# Patient Record
Sex: FEMALE | Race: ASIAN | Employment: UNEMPLOYED | URBAN - METROPOLITAN AREA
[De-identification: names, ages, dates, MRNs, and addresses within clinical notes are randomized per-mention and may not be internally consistent; named-entity substitution may affect disease eponyms.]

---

## 2017-08-08 ENCOUNTER — OFFICE VISIT (OUTPATIENT)
Dept: FAMILY MEDICINE CLINIC | Facility: CLINIC | Age: 27
End: 2017-08-08

## 2017-08-08 VITALS
SYSTOLIC BLOOD PRESSURE: 104 MMHG | BODY MASS INDEX: 32.43 KG/M2 | RESPIRATION RATE: 16 BRPM | HEIGHT: 59.5 IN | DIASTOLIC BLOOD PRESSURE: 76 MMHG | TEMPERATURE: 99 F | HEART RATE: 80 BPM | WEIGHT: 163 LBS

## 2017-08-08 DIAGNOSIS — R63.5 WEIGHT GAIN: Primary | ICD-10-CM

## 2017-08-08 DIAGNOSIS — E78.5 HYPERLIPIDEMIA, UNSPECIFIED HYPERLIPIDEMIA TYPE: ICD-10-CM

## 2017-08-08 PROCEDURE — 99203 OFFICE O/P NEW LOW 30 MIN: CPT | Performed by: FAMILY MEDICINE

## 2017-08-08 NOTE — PROGRESS NOTES
Pari Robledo is a 32year old female. CHIEF COMPLAINT   Follow up on labs from gyne  HPI:   Seeing Dr. Gaurav alfonso on 8/24/17. Dx with probable PCOS by gyne.   Has gained about 10 kg in last 3 months - hasn't been exercising and eating well due to recent mov with results of lipid panel.

## 2017-11-17 RX ORDER — IBUPROFEN 600 MG/1
600 TABLET ORAL EVERY 6 HOURS PRN
COMMUNITY
End: 2018-01-17

## 2017-11-17 RX ORDER — FERROUS SULFATE 325(65) MG
325 TABLET ORAL 2 TIMES DAILY
COMMUNITY

## 2017-11-20 ENCOUNTER — ANESTHESIA EVENT (OUTPATIENT)
Dept: SURGERY | Facility: HOSPITAL | Age: 27
End: 2017-11-20

## 2017-11-20 ENCOUNTER — SURGERY (OUTPATIENT)
Age: 27
End: 2017-11-20

## 2017-11-20 ENCOUNTER — HOSPITAL ENCOUNTER (OUTPATIENT)
Facility: HOSPITAL | Age: 27
Setting detail: HOSPITAL OUTPATIENT SURGERY
Discharge: HOME | End: 2017-11-20
Attending: OBSTETRICS & GYNECOLOGY | Admitting: OBSTETRICS & GYNECOLOGY
Payer: COMMERCIAL

## 2017-11-20 ENCOUNTER — ANESTHESIA (OUTPATIENT)
Dept: SURGERY | Facility: HOSPITAL | Age: 27
End: 2017-11-20

## 2017-11-20 VITALS
TEMPERATURE: 99 F | WEIGHT: 169.75 LBS | BODY MASS INDEX: 32.05 KG/M2 | OXYGEN SATURATION: 100 % | HEART RATE: 88 BPM | HEIGHT: 61 IN | RESPIRATION RATE: 18 BRPM | DIASTOLIC BLOOD PRESSURE: 78 MMHG | SYSTOLIC BLOOD PRESSURE: 108 MMHG

## 2017-11-20 DIAGNOSIS — O02.1 MISSED ABORTION: Primary | ICD-10-CM

## 2017-11-20 PROCEDURE — 88305 TISSUE EXAM BY PATHOLOGIST: CPT | Performed by: OBSTETRICS & GYNECOLOGY

## 2017-11-20 PROCEDURE — 10D17ZZ EXTRACTION OF PRODUCTS OF CONCEPTION, RETAINED, VIA NATURAL OR ARTIFICIAL OPENING: ICD-10-PCS | Performed by: OBSTETRICS & GYNECOLOGY

## 2017-11-20 RX ORDER — HYDROCODONE BITARTRATE AND ACETAMINOPHEN 5; 325 MG/1; MG/1
1 TABLET ORAL AS NEEDED
Status: COMPLETED | OUTPATIENT
Start: 2017-11-20 | End: 2017-11-20

## 2017-11-20 RX ORDER — ACETAMINOPHEN 500 MG
1000 TABLET ORAL ONCE AS NEEDED
Status: DISCONTINUED | OUTPATIENT
Start: 2017-11-20 | End: 2017-11-20

## 2017-11-20 RX ORDER — HYDROMORPHONE HYDROCHLORIDE 1 MG/ML
0.4 INJECTION, SOLUTION INTRAMUSCULAR; INTRAVENOUS; SUBCUTANEOUS EVERY 5 MIN PRN
Status: DISCONTINUED | OUTPATIENT
Start: 2017-11-20 | End: 2017-11-20

## 2017-11-20 RX ORDER — HYDROCODONE BITARTRATE AND ACETAMINOPHEN 5; 325 MG/1; MG/1
2 TABLET ORAL AS NEEDED
Status: COMPLETED | OUTPATIENT
Start: 2017-11-20 | End: 2017-11-20

## 2017-11-20 RX ORDER — SODIUM CHLORIDE, SODIUM LACTATE, POTASSIUM CHLORIDE, CALCIUM CHLORIDE 600; 310; 30; 20 MG/100ML; MG/100ML; MG/100ML; MG/100ML
INJECTION, SOLUTION INTRAVENOUS CONTINUOUS
Status: DISCONTINUED | OUTPATIENT
Start: 2017-11-20 | End: 2017-11-20

## 2017-11-20 RX ORDER — HYDROCODONE BITARTRATE AND ACETAMINOPHEN 5; 325 MG/1; MG/1
TABLET ORAL
Status: DISCONTINUED
Start: 2017-11-20 | End: 2017-11-20

## 2017-11-20 RX ORDER — LIDOCAINE HYDROCHLORIDE 10 MG/ML
INJECTION, SOLUTION INFILTRATION; PERINEURAL AS NEEDED
Status: DISCONTINUED | OUTPATIENT
Start: 2017-11-20 | End: 2017-11-20

## 2017-11-20 RX ORDER — NALOXONE HYDROCHLORIDE 0.4 MG/ML
80 INJECTION, SOLUTION INTRAMUSCULAR; INTRAVENOUS; SUBCUTANEOUS AS NEEDED
Status: DISCONTINUED | OUTPATIENT
Start: 2017-11-20 | End: 2017-11-20

## 2017-11-20 RX ORDER — DIPHENHYDRAMINE HYDROCHLORIDE 50 MG/ML
12.5 INJECTION INTRAMUSCULAR; INTRAVENOUS AS NEEDED
Status: DISCONTINUED | OUTPATIENT
Start: 2017-11-20 | End: 2017-11-20

## 2017-11-20 RX ORDER — ONDANSETRON 2 MG/ML
4 INJECTION INTRAMUSCULAR; INTRAVENOUS AS NEEDED
Status: DISCONTINUED | OUTPATIENT
Start: 2017-11-20 | End: 2017-11-20

## 2017-11-20 RX ORDER — MORPHINE SULFATE 2 MG/ML
2 INJECTION, SOLUTION INTRAMUSCULAR; INTRAVENOUS EVERY 5 MIN PRN
Status: DISCONTINUED | OUTPATIENT
Start: 2017-11-20 | End: 2017-11-20

## 2017-11-20 RX ORDER — MEPERIDINE HYDROCHLORIDE 25 MG/ML
12.5 INJECTION INTRAMUSCULAR; INTRAVENOUS; SUBCUTANEOUS AS NEEDED
Status: DISCONTINUED | OUTPATIENT
Start: 2017-11-20 | End: 2017-11-20

## 2017-11-20 RX ORDER — METOCLOPRAMIDE HYDROCHLORIDE 5 MG/ML
10 INJECTION INTRAMUSCULAR; INTRAVENOUS AS NEEDED
Status: DISCONTINUED | OUTPATIENT
Start: 2017-11-20 | End: 2017-11-20

## 2017-11-20 NOTE — BRIEF OP NOTE
Pre-Operative Diagnosis: MISSED , 6 weeks,      Post-Operative Diagnosis: missed      Procedure Performed:   Procedure(s):  SUCTION DILATION AND CURETTAGE    Surgeon(s) and Role:     Charla Santana MD, MD - Primary    Assistant(s):

## 2017-11-20 NOTE — H&P
[facility]    PATIENT'S NAME: Etienne Silver   ATTENDING PHYSICIAN: Omega Sin M.D.    PATIENT ACCOUNT#:   [de-identified]    LOCATION:  OR   [facility]  MEDICAL RECORD #:   [id number]       YOB: 1990  ADMISSION DATE:       11/20/2017    HISTOR 16, every 30 days for 7 days. This is her first pregnancy. SOCIAL HISTORY:  No smoking, no alcohol, no drug use. GYNECOLOGIC HISTORY:  No history of STDs, but she does have a history of PCOS as mentioned.       PHYSICAL EXAMINATION:  She is 5 feet

## 2017-11-20 NOTE — ANESTHESIA POSTPROCEDURE EVALUATION
[address] Patient Status:  Hospital Outpatient Surgery   Age/Gender 32year old female MRN [id number]   Location [address] [place] [address] Attending Veda Rowell MD, MD   [facility] Day # 0 PCP Violeta Mcdermott MD

## 2017-11-20 NOTE — ANESTHESIA PREPROCEDURE EVALUATION
PRE-OP EVALUATION    Patient Name: Mireya Beard    Pre-op Diagnosis: MISSED     Procedure(s):  SUCTION DILATION AND CURETTAGE    Surgeon(s) and Role:     Zuri Raya MD, MD - Primary    Pre-op vitals reviewed.   Temp: 98.4 °F (36.9 °C)  Pulse ASA: 1   Plan: MAC  NPO status verified and patient meets guidelines. Patient has not taken beta blockers in last 24 hours. Plan/risks discussed with: patient and spouse                Present on Admission:  **None**    We discussed MAC.

## 2017-11-21 NOTE — OPERATIVE REPORT
[facility]    PATIENT'S NAME: Puneet Silva   ATTENDING PHYSICIAN: Rosario Sands M.D. OPERATING PHYSICIAN: Rosario Sands M.D.    PATIENT ACCOUNT#:   [de-identified]    LOCATION:  PREOPASCC  PRE ASCC 3 EDWP 10  MEDICAL RECORD #:   [id number]       DATE tenaculum was placed at the 12 o'clock position on the cervix and then the cervix easily dilated to a #7 Hegar dilator. I did sound her uterus at the same time, and she sounded to approximately 8 to 8.5. There was done before and after the procedure.   Us

## 2017-12-12 ENCOUNTER — TELEPHONE (OUTPATIENT)
Dept: OBGYN UNIT | Facility: HOSPITAL | Age: 27
End: 2017-12-12

## 2018-01-17 ENCOUNTER — TELEPHONE (OUTPATIENT)
Dept: HEMATOLOGY/ONCOLOGY | Facility: HOSPITAL | Age: 28
End: 2018-01-17

## 2018-01-17 ENCOUNTER — OFFICE VISIT (OUTPATIENT)
Dept: HEMATOLOGY/ONCOLOGY | Facility: HOSPITAL | Age: 28
End: 2018-01-17
Attending: INTERNAL MEDICINE
Payer: COMMERCIAL

## 2018-01-17 VITALS
HEART RATE: 74 BPM | RESPIRATION RATE: 18 BRPM | BODY MASS INDEX: 32.32 KG/M2 | TEMPERATURE: 98 F | OXYGEN SATURATION: 98 % | HEIGHT: 61 IN | SYSTOLIC BLOOD PRESSURE: 114 MMHG | DIASTOLIC BLOOD PRESSURE: 73 MMHG | WEIGHT: 171.19 LBS

## 2018-01-17 DIAGNOSIS — R53.83 FATIGUE, UNSPECIFIED TYPE: ICD-10-CM

## 2018-01-17 DIAGNOSIS — D56.3 THALASSEMIA TRAIT, BETA: ICD-10-CM

## 2018-01-17 DIAGNOSIS — D64.9 ANEMIA, UNSPECIFIED TYPE: Primary | ICD-10-CM

## 2018-01-17 DIAGNOSIS — E55.9 VITAMIN D DEFICIENCY: ICD-10-CM

## 2018-01-17 PROBLEM — D50.0 IRON DEFICIENCY ANEMIA DUE TO CHRONIC BLOOD LOSS: Status: ACTIVE | Noted: 2018-01-17

## 2018-01-17 PROBLEM — D50.9 MICROCYTIC ANEMIA: Status: ACTIVE | Noted: 2018-01-17

## 2018-01-17 LAB
25-HYDROXYVITAMIN D (TOTAL): 12.8 NG/ML (ref 30–100)
BASOPHILS # BLD AUTO: 0.02 X10(3) UL (ref 0–0.1)
BASOPHILS NFR BLD AUTO: 0.3 %
DEPRECATED HBV CORE AB SER IA-ACNC: 69.5 NG/ML (ref 10–291)
EOSINOPHIL # BLD AUTO: 0.13 X10(3) UL (ref 0–0.3)
EOSINOPHIL NFR BLD AUTO: 1.8 %
ERYTHROCYTE [DISTWIDTH] IN BLOOD BY AUTOMATED COUNT: 16.7 % (ref 11.5–16)
FREE T4: 0.9 NG/DL (ref 0.9–1.8)
HAV AB SERPL IA-ACNC: 340 PG/ML (ref 193–986)
HCT VFR BLD AUTO: 34.1 % (ref 34–50)
HGB BLD-MCNC: 10.4 G/DL (ref 12–16)
IMMATURE GRANULOCYTE COUNT: 0.02 X10(3) UL (ref 0–1)
IMMATURE GRANULOCYTE RATIO %: 0.3 %
IRON SATURATION: 18 % (ref 13–45)
IRON: 73 UG/DL (ref 28–170)
LYMPHOCYTES # BLD AUTO: 1.64 X10(3) UL (ref 0.9–4)
LYMPHOCYTES NFR BLD AUTO: 23.2 %
MCH RBC QN AUTO: 20.8 PG (ref 27–33.2)
MCHC RBC AUTO-ENTMCNC: 30.5 G/DL (ref 31–37)
MCV RBC AUTO: 68.2 FL (ref 81–100)
MONOCYTES # BLD AUTO: 0.47 X10(3) UL (ref 0.1–0.6)
MONOCYTES NFR BLD AUTO: 6.6 %
NEUTROPHIL ABS PRELIM: 4.79 X10 (3) UL (ref 1.3–6.7)
NEUTROPHILS # BLD AUTO: 4.79 X10(3) UL (ref 1.3–6.7)
NEUTROPHILS NFR BLD AUTO: 67.8 %
PLATELET # BLD AUTO: 279 10(3)UL (ref 150–450)
RBC # BLD AUTO: 5 X10(6)UL (ref 3.8–5.1)
RED CELL DISTRIBUTION WIDTH-SD: 39.1 FL (ref 35.1–46.3)
TOTAL IRON BINDING CAPACITY: 407 UG/DL (ref 298–536)
TRANSFERRIN: 273 MG/DL (ref 200–360)
TSI SER-ACNC: 1.49 MIU/ML (ref 0.35–5.5)
WBC # BLD AUTO: 7.1 X10(3) UL (ref 4–13)

## 2018-01-17 PROCEDURE — 99243 OFF/OP CNSLTJ NEW/EST LOW 30: CPT | Performed by: INTERNAL MEDICINE

## 2018-01-17 RX ORDER — ERGOCALCIFEROL 1.25 MG/1
50000 CAPSULE ORAL WEEKLY
Qty: 12 CAPSULE | Refills: 0 | Status: SHIPPED | OUTPATIENT
Start: 2018-01-17 | End: 2018-04-05

## 2018-01-17 RX ORDER — FOLIC ACID 1 MG/1
1 TABLET ORAL DAILY
COMMUNITY
End: 2018-10-15 | Stop reason: ALTCHOICE

## 2018-01-17 NOTE — PROGRESS NOTES
Patient here for consult of anemia. Patient states 2-3 years ago in [facility] she had some issues and was put on iron pills. Patient was fine and started having issues again. Patient currently on oral iron BID. Patient states she is fatigued all the time.  Lucy

## 2018-01-17 NOTE — TELEPHONE ENCOUNTER
Kiley Ward MD  P Edw Michaela Kaur Rns              Call, Hb and Fe better. May reduce oral iron to once every other day. Vit D low-Rx sent to her pharmacy-12 weekly doses.  Otis repeat CBC and fe panel in 2 months for further POC      Spoke to patient,

## 2018-01-17 NOTE — CONSULTS
[facility] Report of Consultation    Patient Name: Michaela Ellsworth   YOB: 1990   Medical Record Number: [id number]   CSN: [id number]   Consulting Physician: Nico Dawkins MD  Referring Physician(s): No ref.  provider found  Date of Consulta Allergies    Current Medications:    Current Outpatient Prescriptions:   •  folic acid 1 MG Oral Tab, Take 1 mg by mouth daily. , Disp: , Rfl:   •  ergocalciferol 28628 units Oral Cap, Take 1 capsule (50,000 Units total) by mouth once a week., Disp: 12 caps non tender with good bowel sounds. No hepatosplenomegaly/mass. Extremities: Pedal pulses are present. No edema. Neurological: Grossly intact.       Labs:      Lab Results  Component Value Date   WBC 7.1 01/17/2018   HGB 10.4 01/17/2018   HCT 34.1 01/17

## 2018-03-07 ENCOUNTER — TELEPHONE (OUTPATIENT)
Dept: HEMATOLOGY/ONCOLOGY | Facility: HOSPITAL | Age: 28
End: 2018-03-07

## 2018-03-07 NOTE — TELEPHONE ENCOUNTER
Pt left VM stating that she is now pregnant and OB has sent over recent lab results for Dr. Isaias Mike to review. Pt is wondering if MD has any new recommendations regarding her anemia now that she is pregnant. Will have MD advise.

## 2018-03-09 NOTE — TELEPHONE ENCOUNTER
Kentrell Guillermo MD  P Edw Michaela [facility] R[facility]   Caller: Unspecified (2 days ago,  1:09 PM)             Call, labs reviewed, Hb bit lower than last. Fe ok. Otis repeat labs in a month-get them done here or at On but need full iron panel.  Further recs based on

## 2018-03-09 NOTE — TELEPHONE ENCOUNTER
Kentrell Guillermo MD  P Edw Michaela Kaur Rns   Caller: Unspecified (2 days ago,  1:09 PM)             Please call, have not received anything yet. Can we get from Schedulicitys office?

## 2018-03-09 NOTE — TELEPHONE ENCOUNTER
Lab results received and will forward to Dr. Tasha Schrader for review. Will call back pt with any MD advice.

## 2018-03-12 ENCOUNTER — TELEPHONE (OUTPATIENT)
Dept: HEMATOLOGY/ONCOLOGY | Facility: HOSPITAL | Age: 28
End: 2018-03-12

## 2018-03-12 NOTE — TELEPHONE ENCOUNTER
Connie from [facility] calling to see what dose of iron MD recommended pt be on. Clarified that pt is to be taking Ferrous Sulfate 325mg PO daily. Pt is also to have repeat CBC, iron studies the beginning of April.

## 2018-03-16 ENCOUNTER — APPOINTMENT (OUTPATIENT)
Dept: HEMATOLOGY/ONCOLOGY | Facility: HOSPITAL | Age: 28
End: 2018-03-16
Attending: INTERNAL MEDICINE
Payer: COMMERCIAL

## 2018-03-25 DIAGNOSIS — D64.9 ANEMIA, UNSPECIFIED TYPE: ICD-10-CM

## 2018-03-25 DIAGNOSIS — R53.83 FATIGUE, UNSPECIFIED TYPE: ICD-10-CM

## 2018-03-25 DIAGNOSIS — E55.9 VITAMIN D DEFICIENCY: ICD-10-CM

## 2018-03-26 RX ORDER — ERGOCALCIFEROL 1.25 MG/1
CAPSULE ORAL
Qty: 12 CAPSULE | Refills: 0 | OUTPATIENT
Start: 2018-03-26

## 2018-04-06 ENCOUNTER — NURSE ONLY (OUTPATIENT)
Dept: ENDOCRINOLOGY CLINIC | Facility: CLINIC | Age: 28
End: 2018-04-06

## 2018-04-06 VITALS
WEIGHT: 166.63 LBS | DIASTOLIC BLOOD PRESSURE: 80 MMHG | BODY MASS INDEX: 31 KG/M2 | HEART RATE: 77 BPM | SYSTOLIC BLOOD PRESSURE: 123 MMHG

## 2018-04-06 DIAGNOSIS — R73.09 ABNORMAL GLUCOSE TOLERANCE TEST: Primary | ICD-10-CM

## 2018-04-06 PROCEDURE — G0108 DIAB MANAGE TRN  PER INDIV: HCPCS | Performed by: DIETITIAN, REGISTERED

## 2018-04-06 RX ORDER — BLOOD SUGAR DIAGNOSTIC
STRIP MISCELLANEOUS
Qty: 400 STRIP | Refills: 2 | Status: ON HOLD | OUTPATIENT
Start: 2018-04-06 | End: 2018-10-20

## 2018-04-06 NOTE — PATIENT INSTRUCTIONS
1. Check blood sugar 4 times daily; fasting before breakfast & 2 hrs after the start of each meal  2. Follow carb servings per meal per guideline; no juices   3.  Can walk 10-15 min after meals to help lower blood sugar reading

## 2018-04-06 NOTE — PROGRESS NOTES
Mateus Alvarenga  ZNU:[id number] was seen for Gestational Diabetes Counseling: Individual/Group  Pt.  Accompanied by  to visit    Date: 4/6/2018  Referring MD: BROOKLYN Veloz  Start time: 1:00pm End time: 3:30pm    Assessment:/80 (BP Location: involvement/social support encouraged. Identification of lifestyle behaviors willing to change discussed. Training Tools Provided:  Edward/Wallis Diabetes and Pregnancy: A guide to self management  BG Log Sheets    Recommendations:      1.  Follow rec

## 2018-04-10 ENCOUNTER — TELEPHONE (OUTPATIENT)
Dept: ENDOCRINOLOGY CLINIC | Facility: CLINIC | Age: 28
End: 2018-04-10

## 2018-04-10 NOTE — TELEPHONE ENCOUNTER
Pt. called to report that if she eats chapati's & rice , her BG levels spike to 140's -150's postmeal. She is now eating [place] yogurt, salads & spinach & tomato soup for her meals.  Now,  BG readings are F:96, postbreakfast 90, postlunch 93 & postdinner 86

## 2018-04-19 ENCOUNTER — OFFICE VISIT (OUTPATIENT)
Dept: PERINATAL CARE | Facility: HOSPITAL | Age: 28
End: 2018-04-19
Attending: OBSTETRICS & GYNECOLOGY
Payer: COMMERCIAL

## 2018-04-19 VITALS
DIASTOLIC BLOOD PRESSURE: 79 MMHG | WEIGHT: 163 LBS | SYSTOLIC BLOOD PRESSURE: 121 MMHG | HEIGHT: 60 IN | HEART RATE: 87 BPM | BODY MASS INDEX: 32 KG/M2

## 2018-04-19 DIAGNOSIS — E11.9 TYPE 2 DIABETES MELLITUS WITHOUT COMPLICATION, WITHOUT LONG-TERM CURRENT USE OF INSULIN (HCC): ICD-10-CM

## 2018-04-19 DIAGNOSIS — D56.3 THALASSEMIA TRAIT, BETA: ICD-10-CM

## 2018-04-19 PROCEDURE — 99243 OFF/OP CNSLTJ NEW/EST LOW 30: CPT | Performed by: OBSTETRICS & GYNECOLOGY

## 2018-04-19 RX ORDER — CHOLECALCIFEROL (VITAMIN D3) 25 MCG
1 TABLET,CHEWABLE ORAL DAILY
COMMUNITY
End: 2018-04-30

## 2018-04-19 NOTE — PROGRESS NOTES
Micaela Holt is a 32year old female. Reason for Consult:   Diabetes. PCOS. Currently on Metformin 500mg BID. Losing weight. Feels very hungry.     Review of History:     OB History:    Obstetric History     T0    L0    SAB0  TAB0  Ectop especially when poorly controlled. There is an increased risk for early pregnancy loss and miscarriage. There is an increased risk for fetal structural abnormlity, most commonly of the heart and spine.   There is an increased risk of growth discrepancies, problems and metabolic complications   The spontaneous  rate in women with PCOS is 20 to 40 percent higher than the baseline in the general obstetric population.  No trials have been performed to specifically address the impact of metformin therapy

## 2018-04-21 ENCOUNTER — PRIOR ORIGINAL RECORDS (OUTPATIENT)
Dept: OTHER | Age: 28
End: 2018-04-21

## 2018-04-30 ENCOUNTER — TELEPHONE (OUTPATIENT)
Dept: PERINATAL CARE | Facility: HOSPITAL | Age: 28
End: 2018-04-30

## 2018-04-30 NOTE — TELEPHONE ENCOUNTER
Pt notified of changes in diabetic management. Metformin 1000 mg in am and 750 mg at pm. Verbalizes understanding. New RX called to pharmacy.

## 2018-05-07 ENCOUNTER — TELEPHONE (OUTPATIENT)
Dept: PERINATAL CARE | Facility: HOSPITAL | Age: 28
End: 2018-05-07

## 2018-05-14 ENCOUNTER — TELEPHONE (OUTPATIENT)
Dept: PERINATAL CARE | Facility: HOSPITAL | Age: 28
End: 2018-05-14

## 2018-05-14 NOTE — TELEPHONE ENCOUNTER
Pt notified of no change in diabetic management. Continue Metformin 1000 mg BID.  Verbalizes understanding

## 2018-05-29 ENCOUNTER — TELEPHONE (OUTPATIENT)
Dept: PERINATAL CARE | Facility: HOSPITAL | Age: 28
End: 2018-05-29

## 2018-05-29 NOTE — TELEPHONE ENCOUNTER
Pt notified of no change in diabetic management. Continue current dose of medication.  Verbalizes understanding

## 2018-06-13 NOTE — PROGRESS NOTES
Ángel Loja  Dear Dr. Carlo Cruz,     Thank you for requesting ultrasound evaluation and maternal fetal medicine consultation on your patient Hoa Ramirez.   As you are aware she is a 31/29 year old female  with a Singl 22w0d)  OFD 62.3 mm 60th% 20w3d  TCD 21.0 mm 58th% 20w4d  HUM 30.9 mm 51st% 20w2d  VENTRp 5.1 mm n/a  CM 4.1 mm 20th%  NUCHAL FOLD 4.96 mm  HC/AC Ratio 1.150  36th%  FL/AC Ratio 0.218  47th%  BPD/FL Ratio 1.388  6th%  HC/FL Ratio 5.266  27th%  EFW (lbs/oz) discussed and reviewed the following issues:  DIABETES MELLITUS  We discussed the risks associated with pregestational diabetes.   There is an increased risk of congenital malformations, fetal growth disturbances, preeclampsia, spontaneous  and intr brachial plexus injury. Accelerated fetal growth is also associated with an increased risk of  metabolic and physiologic disturbances.  Continued control of blood glucose concentration during the third trimester is important to minimize the risk of data from large or randomized trials on which to make an evidenced-based recommendation as to which pregnancies complicated by diabetes should undergo fetal surveillance, when to start, what test to order, or how often to perform it.    Most experts agree s glucose records were reviewed today; her compliance with the recommended four times daily assessments (fasting and two-hour post-prandial) is good. Her overall glucose control is good.     The patient is presently on diet therapy; her compliance with her disease and thalassemia. Sickle cell disease and thalassemia are among the most common genetic diseases worldwide. About 7 percent of pregnant women are gene carriers of beta or alpha thalassemia, or hemoglobin (Hb) S, C, D [place] or E.    Over 1 monitored closely for progression of their anemia, and/or the development of worsening evidence of the complications of hemolysis or extramedullary erythropoiesis.  In the majority of these patients, chronic transfusion therapy will initially have to be imp approximate physician face-to-face time was 40 minutes.

## 2018-06-14 ENCOUNTER — OFFICE VISIT (OUTPATIENT)
Dept: PERINATAL CARE | Facility: HOSPITAL | Age: 28
End: 2018-06-14
Attending: OBSTETRICS & GYNECOLOGY
Payer: COMMERCIAL

## 2018-06-14 VITALS
BODY MASS INDEX: 32 KG/M2 | SYSTOLIC BLOOD PRESSURE: 135 MMHG | DIASTOLIC BLOOD PRESSURE: 77 MMHG | HEART RATE: 103 BPM | WEIGHT: 164 LBS

## 2018-06-14 DIAGNOSIS — E11.9 TYPE 2 DIABETES MELLITUS WITHOUT COMPLICATION, WITHOUT LONG-TERM CURRENT USE OF INSULIN (HCC): ICD-10-CM

## 2018-06-14 DIAGNOSIS — D56.3 THALASSEMIA TRAIT, BETA: ICD-10-CM

## 2018-06-14 DIAGNOSIS — O09.612 HIGH-RISK PREGNANCY, YOUNG PRIMIGRAVIDA IN SECOND TRIMESTER: ICD-10-CM

## 2018-06-14 PROCEDURE — 76811 OB US DETAILED SNGL FETUS: CPT | Performed by: OBSTETRICS & GYNECOLOGY

## 2018-06-14 PROCEDURE — 99215 OFFICE O/P EST HI 40 MIN: CPT | Performed by: OBSTETRICS & GYNECOLOGY

## 2018-07-02 ENCOUNTER — PRIOR ORIGINAL RECORDS (OUTPATIENT)
Dept: OTHER | Age: 28
End: 2018-07-02

## 2018-07-02 LAB
ALBUMIN: 4.4 G/DL
ALKALINE PHOSPHATATE(ALK PHOS): 49 IU/L
BILIRUBIN TOTAL: 0.2 MG/DL
BUN: 8 MG/DL
CALCIUM: 9.8 MG/DL
CHLORIDE: 101 MEQ/L
CREATININE, SERUM: 0.5 MG/DL
GGT: 9 IU/L
GLUCOSE: 89 MG/DL
HEMATOCRIT: 32.1 %
HEMOGLOBIN: 10 G/DL
LDH: 187 IU/L
PLATELETS: 233 K/UL
POTASSIUM, SERUM: 4.9 MEQ/L
PROTEIN, TOTAL: 6.8 G/DL
RED BLOOD COUNT: 4.9 X 10-6/U
SGOT (AST): 18 IU/L
SGPT (ALT): 16 IU/L
SODIUM: 138 MEQ/L
URIC ACID: 3.1 MG/DL
WHITE BLOOD COUNT: 8.4 X 10-3/U

## 2018-07-09 ENCOUNTER — PRIOR ORIGINAL RECORDS (OUTPATIENT)
Dept: OTHER | Age: 28
End: 2018-07-09

## 2018-07-09 ENCOUNTER — MYAURORA ACCOUNT LINK (OUTPATIENT)
Dept: OTHER | Age: 28
End: 2018-07-09

## 2018-07-11 NOTE — PROGRESS NOTES
Mohan Maier    Dear Dr. Shazia Clark    Thank you for requesting ultrasound evaluation and maternal fetal medicine consultation on your patient Augustin Machuca.   As you are aware she is a 32year old female  with a Russo p issues:  GESTATIONAL DIABETES - follow-up  ADA diet recommendations were reviewed and the patient's dietary compliance is adequate.   Her capillary blood glucose records were reviewed on 7/9/18; her compliance with the recommended four times daily assessmen

## 2018-07-13 ENCOUNTER — OFFICE VISIT (OUTPATIENT)
Dept: PERINATAL CARE | Facility: HOSPITAL | Age: 28
End: 2018-07-13
Attending: OBSTETRICS & GYNECOLOGY
Payer: COMMERCIAL

## 2018-07-13 ENCOUNTER — PRIOR ORIGINAL RECORDS (OUTPATIENT)
Dept: OTHER | Age: 28
End: 2018-07-13

## 2018-07-13 DIAGNOSIS — O24.112 PRE-EXISTING TYPE 2 DIABETES MELLITUS DURING PREGNANCY IN SECOND TRIMESTER: ICD-10-CM

## 2018-07-13 DIAGNOSIS — E11.9 TYPE 2 DIABETES MELLITUS WITHOUT COMPLICATION, WITHOUT LONG-TERM CURRENT USE OF INSULIN (HCC): ICD-10-CM

## 2018-07-13 PROCEDURE — 99215 OFFICE O/P EST HI 40 MIN: CPT | Performed by: OBSTETRICS & GYNECOLOGY

## 2018-07-13 PROCEDURE — 93325 DOPPLER ECHO COLOR FLOW MAPG: CPT | Performed by: OBSTETRICS & GYNECOLOGY

## 2018-07-13 PROCEDURE — 76827 ECHO EXAM OF FETAL HEART: CPT | Performed by: OBSTETRICS & GYNECOLOGY

## 2018-07-13 PROCEDURE — 76825 ECHO EXAM OF FETAL HEART: CPT | Performed by: OBSTETRICS & GYNECOLOGY

## 2018-07-31 ENCOUNTER — TELEPHONE (OUTPATIENT)
Dept: PERINATAL CARE | Facility: HOSPITAL | Age: 28
End: 2018-07-31

## 2018-07-31 DIAGNOSIS — O24.113 PRE-EXISTING TYPE 2 DIABETES MELLITUS IN PREGNANCY IN THIRD TRIMESTER: Primary | ICD-10-CM

## 2018-07-31 NOTE — TELEPHONE ENCOUNTER
Updated pt on changes to her medication regiment. She is to add 2 units of humalog with dinner. Prescription is being sent to her [facility] of record.

## 2018-08-01 ENCOUNTER — TELEPHONE (OUTPATIENT)
Dept: PERINATAL CARE | Facility: HOSPITAL | Age: 28
End: 2018-08-01

## 2018-08-01 NOTE — TELEPHONE ENCOUNTER
Bacilio Barton from Dr Cassia Peñaloza office called to inform this clinic that Jacob Lakhani has refused to start the prescribed insulin ordered for her yesterday. She is going to continue to take her Metformin.

## 2018-08-02 ENCOUNTER — TELEPHONE (OUTPATIENT)
Dept: HEMATOLOGY/ONCOLOGY | Facility: HOSPITAL | Age: 28
End: 2018-08-02

## 2018-08-02 NOTE — TELEPHONE ENCOUNTER
----- Message from Mila Penny MD sent at 8/2/2018  7:53 AM CDT -----  Call. Hb stable 9.6.  Otis repeat CBC and fe panel next month with ob or here, no Fe panel done this time

## 2018-08-02 NOTE — TELEPHONE ENCOUNTER
Pt made aware of results/MD orders and verbalized understanding. States she will have done at [place] office next month.

## 2018-08-14 ENCOUNTER — MED REC SCAN ONLY (OUTPATIENT)
Dept: HEMATOLOGY/ONCOLOGY | Facility: HOSPITAL | Age: 28
End: 2018-08-14

## 2018-09-17 ENCOUNTER — TELEPHONE (OUTPATIENT)
Dept: PERINATAL CARE | Facility: HOSPITAL | Age: 28
End: 2018-09-17

## 2018-09-17 DIAGNOSIS — E11.9 TYPE 2 DIABETES MELLITUS WITHOUT COMPLICATION, WITHOUT LONG-TERM CURRENT USE OF INSULIN (HCC): ICD-10-CM

## 2018-09-26 ENCOUNTER — TELEPHONE (OUTPATIENT)
Dept: HEMATOLOGY/ONCOLOGY | Facility: HOSPITAL | Age: 28
End: 2018-09-26

## 2018-09-26 NOTE — TELEPHONE ENCOUNTER
Wade Rojas MD  P [facility] [address] [facility] Rns             Call, Hb stable at 9.6. Otis repeat 2 weeks, need Fe, TIBC and ferritin with next lab     Spoke with Ariana Hagan, RN at [facility] and gave verbal MD orders.  Ariana Hagan verbalized understanding a

## 2018-10-10 ENCOUNTER — TELEPHONE (OUTPATIENT)
Dept: HEMATOLOGY/ONCOLOGY | Facility: HOSPITAL | Age: 28
End: 2018-10-10

## 2018-10-10 ENCOUNTER — MED REC SCAN ONLY (OUTPATIENT)
Dept: HEMATOLOGY/ONCOLOGY | Facility: HOSPITAL | Age: 28
End: 2018-10-10

## 2018-10-10 NOTE — TELEPHONE ENCOUNTER
Paramjit Lorenzana MD  P Ed[facility] Michaela Quintanilla Rns             Call, Hb just bit lower at 9.3, not too bad and Fe bit low also. When is she due?  Can get a dose of I.V iron is she is symptomatic, or can continue oral      Patient states she is feeling good, denies

## 2018-10-15 ENCOUNTER — TELEPHONE (OUTPATIENT)
Dept: OBGYN UNIT | Facility: HOSPITAL | Age: 28
End: 2018-10-15

## 2018-10-17 ENCOUNTER — APPOINTMENT (OUTPATIENT)
Dept: OBGYN CLINIC | Facility: HOSPITAL | Age: 28
End: 2018-10-17
Payer: COMMERCIAL

## 2018-10-17 ENCOUNTER — HOSPITAL ENCOUNTER (INPATIENT)
Facility: HOSPITAL | Age: 28
LOS: 3 days | Discharge: HOME | End: 2018-10-20
Attending: STUDENT IN AN ORGANIZED HEALTH CARE EDUCATION/TRAINING PROGRAM | Admitting: STUDENT IN AN ORGANIZED HEALTH CARE EDUCATION/TRAINING PROGRAM
Payer: COMMERCIAL

## 2018-10-17 DIAGNOSIS — D64.9 ANEMIA, UNSPECIFIED TYPE: Primary | ICD-10-CM

## 2018-10-17 DIAGNOSIS — D50.0 IRON DEFICIENCY ANEMIA DUE TO CHRONIC BLOOD LOSS: ICD-10-CM

## 2018-10-17 PROBLEM — Z34.90 PREGNANCY: Status: ACTIVE | Noted: 2018-10-17

## 2018-10-17 PROCEDURE — 10907ZC DRAINAGE OF AMNIOTIC FLUID, THERAPEUTIC FROM PRODUCTS OF CONCEPTION, VIA NATURAL OR ARTIFICIAL OPENING: ICD-10-PCS | Performed by: OBSTETRICS & GYNECOLOGY

## 2018-10-17 PROCEDURE — 3E033VJ INTRODUCTION OF OTHER HORMONE INTO PERIPHERAL VEIN, PERCUTANEOUS APPROACH: ICD-10-PCS | Performed by: OBSTETRICS & GYNECOLOGY

## 2018-10-17 PROCEDURE — 3E0P7VZ INTRODUCTION OF HORMONE INTO FEMALE REPRODUCTIVE, VIA NATURAL OR ARTIFICIAL OPENING: ICD-10-PCS | Performed by: OBSTETRICS & GYNECOLOGY

## 2018-10-17 RX ORDER — SODIUM CHLORIDE, SODIUM LACTATE, POTASSIUM CHLORIDE, CALCIUM CHLORIDE 600; 310; 30; 20 MG/100ML; MG/100ML; MG/100ML; MG/100ML
INJECTION, SOLUTION INTRAVENOUS CONTINUOUS
Status: DISCONTINUED | OUTPATIENT
Start: 2018-10-17 | End: 2018-10-18 | Stop reason: HOSPADM

## 2018-10-17 RX ORDER — DEXTROSE, SODIUM CHLORIDE, SODIUM LACTATE, POTASSIUM CHLORIDE, AND CALCIUM CHLORIDE 5; .6; .31; .03; .02 G/100ML; G/100ML; G/100ML; G/100ML; G/100ML
INJECTION, SOLUTION INTRAVENOUS AS NEEDED
Status: DISCONTINUED | OUTPATIENT
Start: 2018-10-17 | End: 2018-10-18 | Stop reason: HOSPADM

## 2018-10-17 RX ORDER — IBUPROFEN 600 MG/1
600 TABLET ORAL ONCE AS NEEDED
Status: DISCONTINUED | OUTPATIENT
Start: 2018-10-17 | End: 2018-10-18 | Stop reason: HOSPADM

## 2018-10-17 RX ORDER — TRISODIUM CITRATE DIHYDRATE AND CITRIC ACID MONOHYDRATE 500; 334 MG/5ML; MG/5ML
30 SOLUTION ORAL AS NEEDED
Status: DISCONTINUED | OUTPATIENT
Start: 2018-10-17 | End: 2018-10-18 | Stop reason: HOSPADM

## 2018-10-17 RX ORDER — TERBUTALINE SULFATE 1 MG/ML
0.25 INJECTION, SOLUTION SUBCUTANEOUS AS NEEDED
Status: DISCONTINUED | OUTPATIENT
Start: 2018-10-17 | End: 2018-10-18 | Stop reason: HOSPADM

## 2018-10-17 RX ORDER — ZOLPIDEM TARTRATE 5 MG/1
5 TABLET ORAL NIGHTLY PRN
Status: DISCONTINUED | OUTPATIENT
Start: 2018-10-17 | End: 2018-10-18 | Stop reason: HOSPADM

## 2018-10-17 RX ORDER — FERROUS SULFATE 325(65) MG
65 TABLET ORAL DAILY
Status: ON HOLD | COMMUNITY
End: 2018-10-17

## 2018-10-17 NOTE — PROGRESS NOTES
Pt is a 29year old female admitted to 108/108-A, Patient presents with:  Scheduled Induction     Pt is 38w1d intra-uterine pregnancy. Denies any leaking of fluid. Reports +fetal movement. History obtained, consents signed.  Oriented to room, staff, and p

## 2018-10-17 NOTE — PLAN OF CARE
ANXIETY    • Will report anxiety at manageable levels Progressing        BIRTH - VAGINAL/ SECTION    • Fetal and maternal status remain reassuring during the birth process Progressing        Diabetes/Glucose Control    • Glucose maintained within p

## 2018-10-18 ENCOUNTER — ANESTHESIA EVENT (OUTPATIENT)
Dept: OBGYN UNIT | Facility: HOSPITAL | Age: 28
End: 2018-10-18

## 2018-10-18 ENCOUNTER — ANESTHESIA (OUTPATIENT)
Dept: OBGYN UNIT | Facility: HOSPITAL | Age: 28
End: 2018-10-18

## 2018-10-18 PROCEDURE — 10H07YZ INSERTION OF OTHER DEVICE INTO PRODUCTS OF CONCEPTION, VIA NATURAL OR ARTIFICIAL OPENING: ICD-10-PCS | Performed by: OBSTETRICS & GYNECOLOGY

## 2018-10-18 RX ORDER — MORPHINE SULFATE 4 MG/ML
2 INJECTION, SOLUTION INTRAMUSCULAR; INTRAVENOUS EVERY 5 MIN PRN
Status: DISCONTINUED | OUTPATIENT
Start: 2018-10-18 | End: 2018-10-18 | Stop reason: HOSPADM

## 2018-10-18 RX ORDER — DEXTROSE, SODIUM CHLORIDE, SODIUM LACTATE, POTASSIUM CHLORIDE, AND CALCIUM CHLORIDE 5; .6; .31; .03; .02 G/100ML; G/100ML; G/100ML; G/100ML; G/100ML
INJECTION, SOLUTION INTRAVENOUS CONTINUOUS
Status: DISCONTINUED | OUTPATIENT
Start: 2018-10-18 | End: 2018-10-20

## 2018-10-18 RX ORDER — SODIUM CHLORIDE, SODIUM LACTATE, POTASSIUM CHLORIDE, CALCIUM CHLORIDE 600; 310; 30; 20 MG/100ML; MG/100ML; MG/100ML; MG/100ML
INJECTION, SOLUTION INTRAVENOUS CONTINUOUS
Status: DISCONTINUED | OUTPATIENT
Start: 2018-10-18 | End: 2018-10-20

## 2018-10-18 RX ORDER — NALBUPHINE HCL 10 MG/ML
2.5 AMPUL (ML) INJECTION EVERY 4 HOURS PRN
Status: DISCONTINUED | OUTPATIENT
Start: 2018-10-18 | End: 2018-10-20

## 2018-10-18 RX ORDER — KETOROLAC TROMETHAMINE 30 MG/ML
30 INJECTION, SOLUTION INTRAMUSCULAR; INTRAVENOUS ONCE AS NEEDED
Status: COMPLETED | OUTPATIENT
Start: 2018-10-18 | End: 2018-10-18

## 2018-10-18 RX ORDER — NALBUPHINE HCL 10 MG/ML
2.5 AMPUL (ML) INJECTION
Status: DISCONTINUED | OUTPATIENT
Start: 2018-10-18 | End: 2018-10-18 | Stop reason: HOSPADM

## 2018-10-18 RX ORDER — ZOLPIDEM TARTRATE 5 MG/1
5 TABLET ORAL NIGHTLY PRN
Status: DISCONTINUED | OUTPATIENT
Start: 2018-10-18 | End: 2018-10-20

## 2018-10-18 RX ORDER — HYDROCODONE BITARTRATE AND ACETAMINOPHEN 10; 325 MG/1; MG/1
1 TABLET ORAL EVERY 4 HOURS PRN
Status: DISCONTINUED | OUTPATIENT
Start: 2018-10-18 | End: 2018-10-20

## 2018-10-18 RX ORDER — MORPHINE SULFATE 4 MG/ML
2 INJECTION, SOLUTION INTRAMUSCULAR; INTRAVENOUS EVERY 2 HOUR PRN
Status: ACTIVE | OUTPATIENT
Start: 2018-10-18 | End: 2018-10-19

## 2018-10-18 RX ORDER — DIPHENHYDRAMINE HCL 25 MG
25 CAPSULE ORAL EVERY 4 HOURS PRN
Status: DISCONTINUED | OUTPATIENT
Start: 2018-10-18 | End: 2018-10-20

## 2018-10-18 RX ORDER — HYDROCODONE BITARTRATE AND ACETAMINOPHEN 5; 325 MG/1; MG/1
1 TABLET ORAL EVERY 4 HOURS PRN
Status: DISCONTINUED | OUTPATIENT
Start: 2018-10-18 | End: 2018-10-20

## 2018-10-18 RX ORDER — EPHEDRINE SULFATE/0.9% NACL/PF 25 MG/5 ML
5 SYRINGE (ML) INTRAVENOUS AS NEEDED
Status: DISCONTINUED | OUTPATIENT
Start: 2018-10-18 | End: 2018-10-18

## 2018-10-18 RX ORDER — BISACODYL 10 MG
10 SUPPOSITORY, RECTAL RECTAL
Status: DISCONTINUED | OUTPATIENT
Start: 2018-10-18 | End: 2018-10-20

## 2018-10-18 RX ORDER — DEXTROSE MONOHYDRATE 25 G/50ML
50 INJECTION, SOLUTION INTRAVENOUS
Status: DISCONTINUED | OUTPATIENT
Start: 2018-10-18 | End: 2018-10-18 | Stop reason: HOSPADM

## 2018-10-18 RX ORDER — DIPHENHYDRAMINE HYDROCHLORIDE 50 MG/ML
12.5 INJECTION INTRAMUSCULAR; INTRAVENOUS EVERY 4 HOURS PRN
Status: DISCONTINUED | OUTPATIENT
Start: 2018-10-18 | End: 2018-10-20

## 2018-10-18 RX ORDER — DEXTROSE MONOHYDRATE 25 G/50ML
50 INJECTION, SOLUTION INTRAVENOUS
Status: DISCONTINUED | OUTPATIENT
Start: 2018-10-18 | End: 2018-10-20

## 2018-10-18 RX ORDER — NALBUPHINE HCL 10 MG/ML
2.5 AMPUL (ML) INJECTION
Status: DISCONTINUED | OUTPATIENT
Start: 2018-10-18 | End: 2018-10-18

## 2018-10-18 RX ORDER — KETOROLAC TROMETHAMINE 30 MG/ML
30 INJECTION, SOLUTION INTRAMUSCULAR; INTRAVENOUS EVERY 6 HOURS PRN
Status: DISPENSED | OUTPATIENT
Start: 2018-10-18 | End: 2018-10-19

## 2018-10-18 RX ORDER — KETOROLAC TROMETHAMINE 30 MG/ML
INJECTION, SOLUTION INTRAMUSCULAR; INTRAVENOUS
Status: COMPLETED
Start: 2018-10-18 | End: 2018-10-18

## 2018-10-18 RX ORDER — DOCUSATE SODIUM 100 MG/1
100 CAPSULE, LIQUID FILLED ORAL
Status: DISCONTINUED | OUTPATIENT
Start: 2018-10-19 | End: 2018-10-20

## 2018-10-18 RX ORDER — DIPHENHYDRAMINE HYDROCHLORIDE 50 MG/ML
25 INJECTION INTRAMUSCULAR; INTRAVENOUS ONCE AS NEEDED
Status: DISCONTINUED | OUTPATIENT
Start: 2018-10-18 | End: 2018-10-18 | Stop reason: HOSPADM

## 2018-10-18 RX ORDER — CEFAZOLIN SODIUM/WATER 2 G/20 ML
2 SYRINGE (ML) INTRAVENOUS
Status: COMPLETED | OUTPATIENT
Start: 2018-10-18 | End: 2018-10-18

## 2018-10-18 RX ORDER — IBUPROFEN 600 MG/1
600 TABLET ORAL EVERY 6 HOURS SCHEDULED
Status: DISCONTINUED | OUTPATIENT
Start: 2018-10-19 | End: 2018-10-20

## 2018-10-18 RX ORDER — MORPHINE SULFATE 4 MG/ML
INJECTION, SOLUTION INTRAMUSCULAR; INTRAVENOUS
Status: COMPLETED
Start: 2018-10-18 | End: 2018-10-18

## 2018-10-18 RX ORDER — NALOXONE HYDROCHLORIDE 0.4 MG/ML
0.08 INJECTION, SOLUTION INTRAMUSCULAR; INTRAVENOUS; SUBCUTANEOUS
Status: ACTIVE | OUTPATIENT
Start: 2018-10-18 | End: 2018-10-19

## 2018-10-18 RX ORDER — SIMETHICONE 80 MG
80 TABLET,CHEWABLE ORAL 3 TIMES DAILY PRN
Status: DISCONTINUED | OUTPATIENT
Start: 2018-10-18 | End: 2018-10-20

## 2018-10-18 RX ORDER — ONDANSETRON 2 MG/ML
4 INJECTION INTRAMUSCULAR; INTRAVENOUS EVERY 6 HOURS PRN
Status: DISCONTINUED | OUTPATIENT
Start: 2018-10-18 | End: 2018-10-20

## 2018-10-18 RX ORDER — CEFAZOLIN SODIUM/WATER 2 G/20 ML
SYRINGE (ML) INTRAVENOUS
Status: COMPLETED
Start: 2018-10-18 | End: 2018-10-18

## 2018-10-18 RX ORDER — ONDANSETRON 2 MG/ML
4 INJECTION INTRAMUSCULAR; INTRAVENOUS ONCE AS NEEDED
Status: DISCONTINUED | OUTPATIENT
Start: 2018-10-18 | End: 2018-10-18 | Stop reason: HOSPADM

## 2018-10-18 NOTE — OPERATIVE REPORT
[facility]  Post-Op  Procedure Note    Mariposa Fitzpatrick Patient Status:  Inpatient    1990 MRN [id number]   Location [address] [facility] Attending Obdulio Gallegos, [address] [facility]  Day # 1 PCP Ambrosio Dowd MD       Preop diagno and extended bilaterally with the weinstein scissors. Kocher clamps were placed on the fascial edges. While tenting up on the fascia, the rectus muscles were dissected off the fascia using sharp and blunt dissection.  The rectus muscles were  at the mid preoperatively.       Mk García  10/18/2018

## 2018-10-18 NOTE — PLAN OF CARE
Problem: SAFETY ADULT - FALL  Goal: Free from fall injury  INTERVENTIONS:  - Assess pt frequently for physical needs  - Identify cognitive and physical deficits and behaviors that affect risk of falls.   - Hartline fall precautions as indicated by assessme

## 2018-10-18 NOTE — ANESTHESIA POSTPROCEDURE EVALUATION
[address] Patient Status:  Inpatient   Age/Gender 29year old female MRN [id number]   Location [address] [facility] Attending Alex Alfaro, [address] [facility] Se Day # 1 PCP Alex Clark MD       Anesthesia Post-op Note    Pr

## 2018-10-18 NOTE — PROGRESS NOTES
[facility]      Labor Progress Note    Fredi Koroma Patient Status:  Inpatient    1990 MRN [id number]   Location [address] [facility] Attending Rita Fisher MD   Hosp Day # 1 PCP Stefan Patel MD     SUBJECTIVE:    Int

## 2018-10-18 NOTE — H&P
[facility]    PATIENT'S NAME: Mia Vazquez   ATTENDING PHYSICIAN: Lexii Blancas MD   PATIENT ACCOUNT#:   [id number]    LOCATION:  [address] [place] [address]  MEDICAL RECORD #:   [id number]       YOB: 1990  ADMISSION DATE:       10/17/2018 measurement which was within normal limits, 1.6 mm. NT labs were normal.  Her 1-hour Glucola in the early part of the pregnancy was 178. Her third trimester HIV negative. Group B strep negative.       OBSTETRICAL HISTORY:  Significant for miscarriage, ha M.D.  d: 10/18/2018 10:50:12  t: 10/18/2018 11:34:46  [facility] [id number]  /

## 2018-10-18 NOTE — PROGRESS NOTES
Report from ADAL Rice RN @ this time. POC discussed and enforced. PT stable sleeping in bed, IV infusing, call light in reach. Will continue to monitor.

## 2018-10-18 NOTE — PROGRESS NOTES
Patient transferred to 2209/2209-A in cart in stable condition. Infant in arms,  at side with belongings. Report phoned to [facility] prior to transfer.

## 2018-10-18 NOTE — PROGRESS NOTES
ADMISSION NOTE  Patient admitted to room in stable condition via: cart    Oriented to room, Safety precautions initiated. Bed in low position, call light in reach. Report received and ID Bands checked & verified with:  Keisha BLAND  Plan of care and safety instr

## 2018-10-18 NOTE — PROGRESS NOTES
Report to Jeannette, day shift RN. POC dicsussed and enforced. Pt stable in shower, IV saline locked. Pt verbalized understanding of POC for the day and to alert the nurse via call light when she is out of the shower. No pt questions at this time.

## 2018-10-18 NOTE — PROGRESS NOTES
Came to evaluate the FHT. Patient is having repetitive decels  with Contractions and the pitocin has been stopped currently. SVE - 3/60%-2.  Discussed with patient and her  regarding the FHT concerns and not able to progress further with no regular C

## 2018-10-18 NOTE — ANESTHESIA PREPROCEDURE EVALUATION
PRE-OP EVALUATION    Patient Name: Elisha Tomas    Pre-op Diagnosis: 45 2/7 weeks, gestational diabetic, fetal intolerance to labor    Procedure(s):      Surgeon(s) and Role:     * Wilver Phan MD - Primary     * Noris Rivas MD - Assisti mL 30 mL Oral PRN   Zolpidem Tartrate (AMBIEN) tab 5 mg 5 mg Oral Nightly PRN   [COMPLETED] dinoprostone (CERVIDIL) vaginal insert 10 mg 10 mg Vaginal Once   [COMPLETED] MetFORMIN HCl (GLUCOPHAGE) tab 1,000 mg 1,000 mg Oral Once       Outpatient Medication tachy    Rhythm: regular  Rate: abnormal     Dental    No notable dental history.          Pulmonary    Pulmonary exam normal.                 Other findings            ASA: 3   Plan: epidural  NPO status verified and           Plan/risks discussed with: pa

## 2018-10-18 NOTE — PROGRESS NOTES
Report received from [facility]. Pt denies any complaints. POC discussed with pt, pt denies questions, call light within reach.

## 2018-10-18 NOTE — PLAN OF CARE
ANXIETY    • Will report anxiety at manageable levels Completed        BIRTH - VAGINAL/ SECTION    • Fetal and maternal status remain reassuring during the birth process Completed        Diabetes/Glucose Control    • Glucose maintained within presc

## 2018-10-18 NOTE — L&D DELIVERY NOTE
Nae Olsen, Girl  [[id number]]    Labor Events     labor?:  No   steroids?:  None  Antibiotics received during labor?:  No  Antibiotics (enter # doses in comment):  none  Rupture date/time:  10/18/2018 0943   Rupture type:  AROM  Fluid color:  C Living   Apgar Scoring Key:     0 1 2    Skin color Blue or pale Acrocyanotic Completely pink    Heart rate Absent <100 bpm >100 bpm    Reflex irritability No response Grimace Cry or active withdrawal    Muscle tone Limp Some flexion Active motion    Respi

## 2018-10-19 PROCEDURE — 99253 IP/OBS CNSLTJ NEW/EST LOW 45: CPT | Performed by: INTERNAL MEDICINE

## 2018-10-19 RX ORDER — CYANOCOBALAMIN 1000 UG/ML
1000 INJECTION INTRAMUSCULAR; SUBCUTANEOUS DAILY
Status: COMPLETED | OUTPATIENT
Start: 2018-10-19 | End: 2018-10-20

## 2018-10-19 NOTE — PROGRESS NOTES
[facility]  Post-Partum Caesarean Section Progress Note    Radha Panda Patient Status:  Inpatient    1990 MRN [id number]   [facility] 2SW-J Attending Alex Alfaro MD   Hosp Day # 2 PCP Alex Clark MD     SUBJECTIVE:

## 2018-10-19 NOTE — CONSULTS
[facility]  Report of Consultation    [facility] Gopalush Patient Status:  Inpatient    1990 MRN [id number]   [facility] 2SW-J Attending Radha Contreras MD   Hosp Day # 2 PCP Martínez Cooley MD     Reason for Consultation:    Darling Henson 18, height 1.524 m (5'), weight 78.5 kg (173 lb), last menstrual period 01/23/2018, SpO2 97 %, currently breastfeeding. General: No acute distress. Alert and oriented x 3. HEENT: Moist mucous membranes. EOM-I. PERRL  Neck: No lymphadenopathy. No JVD.

## 2018-10-20 VITALS
DIASTOLIC BLOOD PRESSURE: 76 MMHG | WEIGHT: 173 LBS | HEART RATE: 83 BPM | TEMPERATURE: 98 F | HEIGHT: 60 IN | RESPIRATION RATE: 18 BRPM | BODY MASS INDEX: 33.96 KG/M2 | OXYGEN SATURATION: 98 % | SYSTOLIC BLOOD PRESSURE: 115 MMHG

## 2018-10-20 PROBLEM — Z34.90 PREGNANCY: Status: RESOLVED | Noted: 2018-10-17 | Resolved: 2018-10-20

## 2018-10-20 RX ORDER — PSEUDOEPHEDRINE HCL 30 MG
100 TABLET ORAL 2 TIMES DAILY
Qty: 28 CAPSULE | Refills: 0 | Status: SHIPPED | OUTPATIENT
Start: 2018-10-20

## 2018-10-20 RX ORDER — HYDROCODONE BITARTRATE AND ACETAMINOPHEN 5; 325 MG/1; MG/1
1 TABLET ORAL EVERY 6 HOURS PRN
Qty: 30 TABLET | Refills: 0 | Status: SHIPPED | OUTPATIENT
Start: 2018-10-20

## 2018-10-20 RX ORDER — IBUPROFEN 600 MG/1
600 TABLET ORAL EVERY 6 HOURS PRN
Qty: 30 TABLET | Refills: 0 | Status: SHIPPED | OUTPATIENT
Start: 2018-10-20 | End: 2018-11-19

## 2018-10-20 NOTE — DISCHARGE SUMMARY
[facility]    PATIENT'S NAME: Shannan Gonzalez   ATTENDING PHYSICIAN: Lexii Sepulveda MD   PATIENT ACCOUNT#:   [id number]    LOCATION:  [address] [place] [address]  MEDICAL RECORD #:   [id number]       YOB: 1990  ADMISSION DATE:       10/17/2018 She was discharged home on Norco 5 one tab p.o. q.6 h. p.r.n. for pain, #30, no refills; and ibuprofen 600 mg 1 tab p.o. q.6 h. p.r.n. for pain, #30, no refills.       DISCHARGE INSTRUCTIONS:  She will follow up in the office in approximately 1 week to 10 d

## 2018-10-20 NOTE — PROGRESS NOTES
[facility]  Post-Partum Caesarean Section Progress Note    Kristen Lowery Patient Status:  Inpatient    1990 MRN [id number]   [facility] 2SW-J Attending Roman Suero MD   Hosp Day # 3 PCP Stevo Cooley MD     SUBJECTIVE:

## 2018-10-20 NOTE — PROGRESS NOTES
Brief Hematology Note:    Hemoglobin is stable. Completed 2 doses of IV iron. Tolerating B12 replacement. Recommend oral B12 1000 micrograms daily after discharge. Will follow peripherally. OK for discharge when cleared by OB.     Follow up wit

## 2018-10-20 NOTE — PLAN OF CARE
GASTROINTESTINAL - ADULT    • Minimal or absence of nausea and vomiting Adequate for Discharge    • Maintains or returns to baseline bowel function Adequate for Discharge        POSTPARTUM    • Long Term Goal:Experiences normal postpartum course Adequate f

## 2018-10-20 NOTE — PROGRESS NOTES
Baby bottlefeeding well, all dc teaching reviewed earlier and all questions answered. Ordering br pump at home, all diabetes education reviewed Pt states comfortable caring for self and baby.   Discharged in stable condition with family and accompanied by s

## 2018-10-20 NOTE — PLAN OF CARE
GASTROINTESTINAL - ADULT    • Minimal or absence of nausea and vomiting Completed    • Maintains or returns to baseline bowel function Completed        POSTPARTUM    • Long Term Goal:Experiences normal postpartum course Completed    • Appropriate maternal

## 2018-10-22 ENCOUNTER — TELEPHONE (OUTPATIENT)
Dept: OBGYN UNIT | Facility: HOSPITAL | Age: 28
End: 2018-10-22

## 2018-10-25 ENCOUNTER — HOSPITAL ENCOUNTER (EMERGENCY)
Facility: HOSPITAL | Age: 28
Discharge: HOME | End: 2018-10-25
Attending: EMERGENCY MEDICINE
Payer: COMMERCIAL

## 2018-10-25 ENCOUNTER — APPOINTMENT (OUTPATIENT)
Dept: GENERAL RADIOLOGY | Facility: HOSPITAL | Age: 28
End: 2018-10-25
Payer: COMMERCIAL

## 2018-10-25 ENCOUNTER — APPOINTMENT (OUTPATIENT)
Dept: CT IMAGING | Facility: HOSPITAL | Age: 28
End: 2018-10-25
Attending: EMERGENCY MEDICINE
Payer: COMMERCIAL

## 2018-10-25 VITALS
OXYGEN SATURATION: 94 % | SYSTOLIC BLOOD PRESSURE: 114 MMHG | BODY MASS INDEX: 34.36 KG/M2 | RESPIRATION RATE: 16 BRPM | DIASTOLIC BLOOD PRESSURE: 79 MMHG | WEIGHT: 175 LBS | HEIGHT: 60 IN | HEART RATE: 79 BPM | TEMPERATURE: 98 F

## 2018-10-25 DIAGNOSIS — R06.02 SHORTNESS OF BREATH: Primary | ICD-10-CM

## 2018-10-25 PROCEDURE — 71275 CT ANGIOGRAPHY CHEST: CPT | Performed by: EMERGENCY MEDICINE

## 2018-10-25 PROCEDURE — 71045 X-RAY EXAM CHEST 1 VIEW: CPT

## 2018-10-25 PROCEDURE — 81001 URINALYSIS AUTO W/SCOPE: CPT | Performed by: EMERGENCY MEDICINE

## 2018-10-25 PROCEDURE — 87086 URINE CULTURE/COLONY COUNT: CPT | Performed by: EMERGENCY MEDICINE

## 2018-10-25 PROCEDURE — 84484 ASSAY OF TROPONIN QUANT: CPT

## 2018-10-25 PROCEDURE — 93005 ELECTROCARDIOGRAM TRACING: CPT

## 2018-10-25 PROCEDURE — 80053 COMPREHEN METABOLIC PANEL: CPT

## 2018-10-25 PROCEDURE — 83880 ASSAY OF NATRIURETIC PEPTIDE: CPT | Performed by: EMERGENCY MEDICINE

## 2018-10-25 PROCEDURE — 85378 FIBRIN DEGRADE SEMIQUANT: CPT

## 2018-10-25 PROCEDURE — 85025 COMPLETE CBC W/AUTO DIFF WBC: CPT

## 2018-10-25 PROCEDURE — 80053 COMPREHEN METABOLIC PANEL: CPT | Performed by: EMERGENCY MEDICINE

## 2018-10-25 PROCEDURE — 85378 FIBRIN DEGRADE SEMIQUANT: CPT | Performed by: EMERGENCY MEDICINE

## 2018-10-25 PROCEDURE — 85025 COMPLETE CBC W/AUTO DIFF WBC: CPT | Performed by: EMERGENCY MEDICINE

## 2018-10-25 PROCEDURE — 99285 EMERGENCY DEPT VISIT HI MDM: CPT

## 2018-10-25 PROCEDURE — 84484 ASSAY OF TROPONIN QUANT: CPT | Performed by: EMERGENCY MEDICINE

## 2018-10-25 PROCEDURE — 93010 ELECTROCARDIOGRAM REPORT: CPT

## 2018-10-25 PROCEDURE — 36415 COLL VENOUS BLD VENIPUNCTURE: CPT

## 2018-10-25 NOTE — ED INITIAL ASSESSMENT (HPI)
Patient states she feels SOB since last night, recent  Oct 18. She states she was unable to lay flat and having SOB when speaking or exerting herself.  She also c/o headache and fatigue

## 2018-10-25 NOTE — ED PROVIDER NOTES
Patient Seen in: [facility] Emergency Department    History   Patient presents with:  Dyspnea CAREY SOB (respiratory)    Stated Complaint: sob post     HPI    Patient is a pleasant 31-year-old female who is one-week status post full-term .   Antonio Trammell moist.   Eyes: Conjunctivae and EOM are normal. Pupils are equal, round, and reactive to light. Neck: Normal range of motion. Neck supple. Cardiovascular: Normal rate, regular rhythm and normal heart sounds.    Pulmonary/Chest: Effort normal and breath MCH 21.2 (*)     MCHC 30.8 (*)     RDW 18.1 (*)     Neutrophil Absolute Prelim 8.35 (*)     Neutrophil Absolute 8.35 (*)     All other components within normal limits   TROPONIN I - Normal   PRO BETA NATRIURETIC PEPTIDE - Normal   CBC WITH DIFFERENTIAL WIT She was tachycardic in triage but in the room now her heart rate is in the 70s, pulse ox 90% on room air. She is not in any distress.   One concern would be for pulmonary embolism, d-dimer sent in triage was elevated so CT of her chest will be done to althea

## 2018-10-26 ENCOUNTER — TELEPHONE (OUTPATIENT)
Dept: OBGYN UNIT | Facility: HOSPITAL | Age: 28
End: 2018-10-26

## 2019-02-28 VITALS
BODY MASS INDEX: 31.91 KG/M2 | SYSTOLIC BLOOD PRESSURE: 110 MMHG | HEART RATE: 81 BPM | DIASTOLIC BLOOD PRESSURE: 58 MMHG | HEIGHT: 61 IN | WEIGHT: 169 LBS

## 2019-03-15 ENCOUNTER — TELEPHONE (OUTPATIENT)
Dept: FAMILY MEDICINE CLINIC | Facility: CLINIC | Age: 29
End: 2019-03-15

## 2019-03-15 NOTE — TELEPHONE ENCOUNTER
Please call patient. We have no record of diabetic eye exam in the last year. Please find out from patient when and where last diabetic eye exam was.   If within the year, please call that office to have a copy of the office note faxed to the PCP's attenti

## 2022-02-25 ENCOUNTER — TELEPHONE (OUTPATIENT)
Dept: FAMILY MEDICINE CLINIC | Facility: CLINIC | Age: 32
End: 2022-02-25

## 2022-02-25 NOTE — TELEPHONE ENCOUNTER
Patient was last seen in our office in 2017. Please call to see if we can get a hold of her to follow-up on diabetes. If unable to contact, please advise Eileen Voss. Since we have not seen the patient since 2017, I would assume she has gone elsewhere and we should remove Dr. Araceli Sands as the PCP.

## (undated) DEVICE — BULB SYRINGE,IRRIGATION WITH PROTECTIVE CAP: Brand: DOVER

## (undated) DEVICE — KENDALL SCD EXPRESS SLEEVES, KNEE LENGTH, MEDIUM: Brand: KENDALL SCD

## (undated) DEVICE — GLOVE BIOGEL M SURG SZ 6-1/2

## (undated) DEVICE — TUBING SUCTION COLLECTION SET

## (undated) DEVICE — SOL  .9 1000ML BTL

## (undated) DEVICE — CANISTER SAFETOUCH SYST DISP

## (undated) DEVICE — GYN CDS: Brand: MEDLINE INDUSTRIES, INC.

## (undated) DEVICE — CURRETTE 7MM CVD

## (undated) DEVICE — NEEDLE SPINAL 20X3-1/2 YELLOW

## (undated) NOTE — LETTER
[facility]  Albin Hung [phone] [facility], [address] [place] [address]    Consent for Operation    Date: __________________    Time: _______________    1.  I authorize the performance upon Raymundo Nava the following operation:                              Primary Avery videotape. [facility] will not be responsible for storage or maintenance of this tape. 6. For the purpose of advancing medical education, I consent to the admittance of observers to the Operating Room.     7. I authorize the use of any specimen, organs Signature of Patient:   ___________________________    When the patient is a minor or mentally incompetent to give consent:  Signature of person authorized to consent for patient: ___________________________   Relationship to patient: _____________________ 3. I understand how the anesthesia medicine will help me (benefits). 4. I understand that with any type of anesthesia medicine there are risks:  a.  The most common risks are: nausea, vomiting, sore throat, muscle soreness, damage to my eyes, mouth, or t _____________________________________________________________________________  Witness        Date   Time  I have verified that the signature is that of the patient or patient’s representative, and that it was signed before the procedure    Page 2 of 2

## (undated) NOTE — LETTER
Dear new mom:    We've missed you! The nurses of [facility] have tried to reach you by phone to ask if you had any questions regarding your health or the care of your new little one.     Please feel free to call your doctor with an

## (undated) NOTE — ED AVS SNAPSHOT
Mireya Beard   MRN: [id number]    Department:  [facility] Emergency Department   Date of Visit:  10/25/2018           Disclosure     Insurance plans vary and the physician(s) referred by the ER may not be covered by your plan.  Please contact your tell this physician (or your personal doctor if your instructions are to return to your personal doctor) about any new or lasting problems. The primary care or specialist physician will see patients referred from the [facility] Emergency Department.  Arthor Bernheim